# Patient Record
Sex: FEMALE | Race: BLACK OR AFRICAN AMERICAN
[De-identification: names, ages, dates, MRNs, and addresses within clinical notes are randomized per-mention and may not be internally consistent; named-entity substitution may affect disease eponyms.]

---

## 2019-10-06 ENCOUNTER — HOSPITAL ENCOUNTER (EMERGENCY)
Dept: HOSPITAL 4 - SED | Age: 24
Discharge: HOME | End: 2019-10-06
Payer: COMMERCIAL

## 2019-10-06 VITALS — BODY MASS INDEX: 28.04 KG/M2 | WEIGHT: 185 LBS | HEIGHT: 68 IN

## 2019-10-06 VITALS — SYSTOLIC BLOOD PRESSURE: 127 MMHG

## 2019-10-06 DIAGNOSIS — Z88.6: ICD-10-CM

## 2019-10-06 DIAGNOSIS — J45.901: Primary | ICD-10-CM

## 2019-10-06 DIAGNOSIS — Z88.0: ICD-10-CM

## 2019-10-06 PROCEDURE — 94640 AIRWAY INHALATION TREATMENT: CPT

## 2019-10-06 PROCEDURE — 99283 EMERGENCY DEPT VISIT LOW MDM: CPT

## 2019-10-06 NOTE — NUR
Patient given written and verbal discharge instructions and verbalizes 
understanding.  ER MD discussed with patient the results and treatment 
provided. Patient in stable condition. ID arm band removed. 

Rx of Albuterol given. Patient educated on pain management and to follow up 
with PMD. Pain Scale 0/10.

Opportunity for questions provided and answered. Medication side effect fact 
sheet provided.

## 2019-10-06 NOTE — NUR
Patient arrived via POV, AAOx4, and ambulatory with steady gait. Patient c/c of 
wheezing and mild pain with increasing pain with deep inspiration. Patient 
states history of asthma and has been out of her inhaler. Patient has wheezing 
bilaterally. Patient able to speak in 5-7 word sentences. Will continue to 
follow up and monitor.

## 2019-11-02 ENCOUNTER — HOSPITAL ENCOUNTER (EMERGENCY)
Dept: HOSPITAL 4 - SED | Age: 24
Discharge: HOME | End: 2019-11-02
Payer: COMMERCIAL

## 2019-11-02 VITALS — HEIGHT: 68 IN | BODY MASS INDEX: 28.49 KG/M2 | WEIGHT: 188 LBS

## 2019-11-02 VITALS — SYSTOLIC BLOOD PRESSURE: 120 MMHG

## 2019-11-02 DIAGNOSIS — Z88.0: ICD-10-CM

## 2019-11-02 DIAGNOSIS — J45.901: Primary | ICD-10-CM

## 2019-11-02 DIAGNOSIS — F12.90: ICD-10-CM

## 2019-11-02 DIAGNOSIS — F17.290: ICD-10-CM

## 2019-11-02 DIAGNOSIS — Z71.6: ICD-10-CM

## 2019-11-02 DIAGNOSIS — Z88.6: ICD-10-CM

## 2019-11-02 PROCEDURE — 99283 EMERGENCY DEPT VISIT LOW MDM: CPT

## 2019-11-02 PROCEDURE — 94640 AIRWAY INHALATION TREATMENT: CPT

## 2019-11-02 PROCEDURE — 71045 X-RAY EXAM CHEST 1 VIEW: CPT

## 2019-11-02 PROCEDURE — 93005 ELECTROCARDIOGRAM TRACING: CPT

## 2019-11-02 RX ADMIN — IPRATROPIUM BROMIDE AND ALBUTEROL SULFATE ONE ML: .5; 3 SOLUTION RESPIRATORY (INHALATION) at 19:59

## 2019-11-22 ENCOUNTER — HOSPITAL ENCOUNTER (EMERGENCY)
Dept: HOSPITAL 4 - SED | Age: 24
LOS: 1 days | Discharge: HOME | End: 2019-11-23
Payer: COMMERCIAL

## 2019-11-22 VITALS — HEIGHT: 68 IN | BODY MASS INDEX: 28.64 KG/M2 | WEIGHT: 189 LBS

## 2019-11-22 VITALS — SYSTOLIC BLOOD PRESSURE: 112 MMHG

## 2019-11-22 DIAGNOSIS — H69.90: ICD-10-CM

## 2019-11-22 DIAGNOSIS — R05: ICD-10-CM

## 2019-11-22 DIAGNOSIS — Z88.8: ICD-10-CM

## 2019-11-22 DIAGNOSIS — J45.909: ICD-10-CM

## 2019-11-22 DIAGNOSIS — Z88.0: ICD-10-CM

## 2019-11-22 DIAGNOSIS — H66.92: Primary | ICD-10-CM

## 2019-11-23 VITALS — SYSTOLIC BLOOD PRESSURE: 112 MMHG

## 2019-11-23 NOTE — NUR
PAtient brought in with friend complaining of runny nose, cough, and 
congestions x 2 weeks with left ear pain today.    Denies any fever , nausea, 
vomiting or diarrhea.    Pain 6/10.  No other complaints/injures per patient or 
as noted. Will continue to monitor.

## 2019-11-23 NOTE — NUR
Patient given written and verbal discharge instructions and verbalizes 
understanding.  ER MD Dr. Jones discussed with patient the results and 
treatment provided. Patient in stable condition. ID arm band removed. 

Rx of albuterol, zithromax, zofran, norco given. Patient educated on pain 
management and to follow up with PMD. Pain Scale 0/10.

Opportunity for questions provided and answered. Medication side effect fact 
sheet provided.

## 2020-12-20 ENCOUNTER — HOSPITAL ENCOUNTER (EMERGENCY)
Dept: HOSPITAL 4 - SED | Age: 25
Discharge: LEFT BEFORE DISCHARGE | End: 2020-12-20
Payer: COMMERCIAL

## 2020-12-20 VITALS — SYSTOLIC BLOOD PRESSURE: 119 MMHG

## 2020-12-20 VITALS — WEIGHT: 185 LBS | BODY MASS INDEX: 28.04 KG/M2 | HEIGHT: 68 IN

## 2020-12-20 DIAGNOSIS — N64.4: Primary | ICD-10-CM

## 2020-12-20 DIAGNOSIS — Z53.21: ICD-10-CM

## 2022-02-17 ENCOUNTER — HOSPITAL ENCOUNTER (EMERGENCY)
Dept: HOSPITAL 4 - SED | Age: 27
LOS: 1 days | Discharge: HOME | End: 2022-02-18
Payer: COMMERCIAL

## 2022-02-17 VITALS — BODY MASS INDEX: 29.1 KG/M2 | HEIGHT: 68 IN | WEIGHT: 192 LBS

## 2022-02-17 VITALS — SYSTOLIC BLOOD PRESSURE: 103 MMHG

## 2022-02-17 DIAGNOSIS — Z79.899: ICD-10-CM

## 2022-02-17 DIAGNOSIS — Z88.6: ICD-10-CM

## 2022-02-17 DIAGNOSIS — J45.901: Primary | ICD-10-CM

## 2022-02-17 DIAGNOSIS — Z88.0: ICD-10-CM

## 2022-02-17 PROCEDURE — 81025 URINE PREGNANCY TEST: CPT

## 2022-02-17 PROCEDURE — 99283 EMERGENCY DEPT VISIT LOW MDM: CPT

## 2022-02-17 PROCEDURE — 96372 THER/PROPH/DIAG INJ SC/IM: CPT

## 2022-02-17 PROCEDURE — 94640 AIRWAY INHALATION TREATMENT: CPT

## 2022-02-17 NOTE — NUR
Pt awake a/o x4. speech clear and coherent. pt states she had an asthma attack 
pta to ed. pt denies sob at this time. speaks in full sentences. respirations 
even and unlabored. chest expansion symmetrical. o2 sat 98% on room air. pt 
states she ran out of inhaler and wants a script, also requesting for a 
breathing tx. denies pain. awaiting for MD swanson. will continue to monitor.

## 2022-02-18 VITALS — SYSTOLIC BLOOD PRESSURE: 118 MMHG

## 2022-02-18 NOTE — NUR
Pt discharged.





Pt awake a/o x4. aci reviewed with pt, verbalized understanding. rx to be 
filled. to follow up with pmd within the next 2-3 days or return to ed if 
condition worsens. vs stable. ambulatory with steady gait unassisted. nad.

## 2022-05-23 ENCOUNTER — HOSPITAL ENCOUNTER (EMERGENCY)
Dept: HOSPITAL 4 - SED | Age: 27
LOS: 1 days | Discharge: HOME | End: 2022-05-24
Payer: COMMERCIAL

## 2022-05-23 VITALS — SYSTOLIC BLOOD PRESSURE: 115 MMHG

## 2022-05-23 VITALS — BODY MASS INDEX: 29.55 KG/M2 | HEIGHT: 68 IN | WEIGHT: 195 LBS
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
## 2022-05-23 DIAGNOSIS — Z88.8: ICD-10-CM

## 2022-05-23 DIAGNOSIS — S80.02XA: ICD-10-CM

## 2022-05-23 DIAGNOSIS — J45.909: ICD-10-CM
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
## 2022-05-23 DIAGNOSIS — Z79.899: ICD-10-CM

## 2022-05-23 DIAGNOSIS — S16.1XXA: Primary | ICD-10-CM

## 2022-05-23 DIAGNOSIS — Z88.6: ICD-10-CM
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
## 2022-05-23 DIAGNOSIS — S09.90XA: ICD-10-CM
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
## 2022-05-24 VITALS — SYSTOLIC BLOOD PRESSURE: 113 MMHG

## 2022-05-24 RX ADMIN — OXYCODONE HYDROCHLORIDE AND ACETAMINOPHEN ONE TAB: 5; 325 TABLET ORAL at 01:24

## 2022-05-24 NOTE — NUR
Patient given written and verbal discharge instructions and verbalizes 
understanding.  ER MD discussed with patient the results and treatment 
provided. Patient in stable condition. ID arm band removed. 

Rx of Tramadol given. Patient educated on pain management and to follow up with 
PMD. Pain Scale 4/10.

Opportunity for questions provided and answered. Medication side effect fact 
sheet provided.

## 2022-05-24 NOTE — NUR
Patient will be admitted to care of Dr Valles.  Admitted to  unit.  .  
Belongings list completed.  Complete and up to date summary report printed. 
SBAR report to Kinsey ALONZO be given over phone with opportunity for questions.